# Patient Record
Sex: MALE | Race: WHITE | NOT HISPANIC OR LATINO
[De-identification: names, ages, dates, MRNs, and addresses within clinical notes are randomized per-mention and may not be internally consistent; named-entity substitution may affect disease eponyms.]

---
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
## 2021-05-11 ENCOUNTER — TRANSCRIPTION ENCOUNTER (OUTPATIENT)
Age: 21
End: 2021-05-11

## 2021-05-12 ENCOUNTER — APPOINTMENT (OUTPATIENT)
Dept: ORTHOPEDIC SURGERY | Facility: CLINIC | Age: 21
End: 2021-05-12
Payer: COMMERCIAL

## 2021-05-12 ENCOUNTER — NON-APPOINTMENT (OUTPATIENT)
Age: 21
End: 2021-05-12

## 2021-05-12 VITALS
WEIGHT: 244 LBS | SYSTOLIC BLOOD PRESSURE: 137 MMHG | HEART RATE: 92 BPM | HEIGHT: 68 IN | DIASTOLIC BLOOD PRESSURE: 83 MMHG | BODY MASS INDEX: 36.98 KG/M2

## 2021-05-12 PROCEDURE — 26600 TREAT METACARPAL FRACTURE: CPT | Mod: RT

## 2021-05-12 PROCEDURE — 99204 OFFICE O/P NEW MOD 45 MIN: CPT | Mod: 57

## 2021-05-12 PROCEDURE — 99072 ADDL SUPL MATRL&STAF TM PHE: CPT

## 2021-05-12 NOTE — HISTORY OF PRESENT ILLNESS
[FreeTextEntry1] : Mr. BAZAN presents for evaluation of a right hand fracture.  He reports he punched a wall on this past Sunday, and immediately felt pain along the ulnar aspect of the hand.  He reports pain and swelling has worsened since the injury.  He was seen in urgent care the following day where x-rays were taken revealing a boxer's fracture.  He was placed in an ulnar gutter splint and presents today for evaluation.  He notes since being splinted his pain has diminished.  He denies paresthesias.

## 2021-05-12 NOTE — PHYSICAL EXAM
[Normal RUE] : Right Upper Extremity: No scars, rashes, lesions, ulcers, skin intact [Normal Finger/nose] : finger to nose coordination [Normal] : no peripheral adenopathy appreciated [de-identified] : Right hand: Splint removed.  There is swelling and ecchymosis over the dorsum of the hand and fifth metacarpal.  There is a mild dorsal prominence over the fifth metacarpal neck. There is no malrotation. There is decreased range of motion secondary to pain. The skin is intact and there is no evidence of infection. [de-identified] : clarer [de-identified] : 3 x-ray views of the right hand taken on 5/10/2021 reviewed today reveals a 5th metacarpal head neck fracture,  minimally displaced

## 2021-05-12 NOTE — ASSESSMENT
[FreeTextEntry1] : 21-year-old male presents status post injury to the hand on 5/9/2021, right hand boxer's fracture. I recommend continued conservative treatment with use of a hand based ulnar gutter splint full time except bathing.  f/u in 2 weeks for repeat xrays and evaluation.\par

## 2021-05-26 ENCOUNTER — APPOINTMENT (OUTPATIENT)
Dept: ORTHOPEDIC SURGERY | Facility: CLINIC | Age: 21
End: 2021-05-26
Payer: COMMERCIAL

## 2021-05-26 VITALS
WEIGHT: 244 LBS | SYSTOLIC BLOOD PRESSURE: 130 MMHG | HEART RATE: 71 BPM | HEIGHT: 68 IN | BODY MASS INDEX: 36.98 KG/M2 | DIASTOLIC BLOOD PRESSURE: 77 MMHG

## 2021-05-26 PROCEDURE — 73130 X-RAY EXAM OF HAND: CPT | Mod: RT

## 2021-05-26 PROCEDURE — 99024 POSTOP FOLLOW-UP VISIT: CPT

## 2021-05-26 NOTE — PHYSICAL EXAM
[Normal RUE] : Right Upper Extremity: No scars, rashes, lesions, ulcers, skin intact [Normal Finger/nose] : finger to nose coordination [Normal] : no peripheral adenopathy appreciated [de-identified] : Right hand: Splint removed.  There is swelling and ecchymosis over the dorsum of the hand and fifth metacarpal.  There is a mild dorsal prominence over the fifth metacarpal neck. There is no malrotation. There is decreased range of motion secondary to pain. The skin is intact and there is no evidence of infection. [de-identified] : clarer [de-identified] : 3 x-ray views of the right hand taken today reviewed reveals a 5th metacarpal head neck fracture,  minimally displaced, no further displacement.

## 2021-05-26 NOTE — ASSESSMENT
[FreeTextEntry1] : 21-year-old male presents status post injury to the hand on 5/9/2021, right hand boxer's fracture. I recommend continued conservative treatment with use of a hand based ulnar gutter splint full time except bathing. He will remain nonweight bearing on the left hand, and has been recommended for light gentle ROM each day.  He will follow up in 3 weeks for reevaluation.

## 2021-05-26 NOTE — HISTORY OF PRESENT ILLNESS
[FreeTextEntry1] : Mr. BAZAN presents for evaluation of a right hand fracture.  He reports he punched a wall on this past Sunday, and immediately felt pain along the ulnar aspect of the hand.  He reports pain and swelling has worsened since the injury.  He was seen in urgent care the following day where x-rays were taken revealing a boxer's fracture.  He was placed in an ulnar gutter splint and presents today for evaluation.  He notes since being splinted his pain has diminished.  He denies paresthesias.\par \par 05/26/2021: Patient presents for reevaluation of his right hand fracture. He is now 2.5 weeks s/p closed treatment of a 5th MC head fracture. He reports his symptoms have improved over the last week. He presents today without his ulnar gutter splint, and reports he had lost it this past weekend.  He presents for repeat imaging today.

## 2021-06-14 ENCOUNTER — APPOINTMENT (OUTPATIENT)
Dept: ORTHOPEDIC SURGERY | Facility: CLINIC | Age: 21
End: 2021-06-14
Payer: COMMERCIAL

## 2021-06-14 DIAGNOSIS — S62.91XD UNSPECIFIED FRACTURE OF RIGHT WRIST AND HAND, SUBSEQUENT ENCOUNTER FOR FRACTURE WITH ROUTINE HEALING: ICD-10-CM

## 2021-06-14 PROCEDURE — 99024 POSTOP FOLLOW-UP VISIT: CPT

## 2021-06-14 PROCEDURE — 73110 X-RAY EXAM OF WRIST: CPT | Mod: RT

## 2021-06-14 NOTE — ASSESSMENT
[FreeTextEntry1] : 21-year-old male presents status post injury to the hand on 5/9/2021, right hand boxer's fracture. I recommend continued conservative treatment with use of a hand based ulnar gutter splint for strenuous activity, he may wean from the brace and begin to use his hand for light dexterity activities. Follow up in 4 weeks for repeat x-rays and evaluation.

## 2021-06-14 NOTE — PHYSICAL EXAM
[Normal RUE] : Right Upper Extremity: No scars, rashes, lesions, ulcers, skin intact [Normal Finger/nose] : finger to nose coordination [Normal] : no peripheral adenopathy appreciated [de-identified] : Right hand: Splint removed.  There is swelling and ecchymosis over the dorsum of the hand and fifth metacarpal.  There is a mild dorsal prominence over the fifth metacarpal neck. There is no malrotation. There is decreased range of motion secondary to pain. The skin is intact and there is no evidence of infection. [de-identified] : clarer [de-identified] : 3 x-ray views of the right hand taken today reviewed reveals a 5th metacarpal head neck fracture,  minimally displaced, no further displacement.

## 2021-06-15 PROBLEM — S62.91XD: Status: ACTIVE | Noted: 2021-05-26

## 2021-07-12 ENCOUNTER — APPOINTMENT (OUTPATIENT)
Dept: ORTHOPEDIC SURGERY | Facility: CLINIC | Age: 21
End: 2021-07-12

## 2021-07-12 NOTE — HISTORY OF PRESENT ILLNESS
[FreeTextEntry1] : Mr. BZAAN presents for evaluation of a right hand fracture.  He reports he punched a wall on this past Sunday, and immediately felt pain along the ulnar aspect of the hand.  He reports pain and swelling has worsened since the injury.  He was seen in urgent care the following day where x-rays were taken revealing a boxer's fracture.  He was placed in an ulnar gutter splint and presents today for evaluation.  He notes since being splinted his pain has diminished.  He denies paresthesias.\par \par 05/26/2021: Patient presents for reevaluation of his right hand fracture. He is now 2.5 weeks s/p closed treatment of a 5th MC head fracture. He reports his symptoms have improved over the last week. He presents today without his ulnar gutter splint, and reports he had lost it this past weekend.  He presents for repeat imaging today. \par \par 07/12/2021: Patient returns for reevaluation of a right hand fracture. He is now 6 weeks s/p closed treatment of a 5th MC head fracture.

## 2021-07-12 NOTE — PHYSICAL EXAM
[Normal RUE] : Right Upper Extremity: No scars, rashes, lesions, ulcers, skin intact [Normal Finger/nose] : finger to nose coordination [Normal] : no peripheral adenopathy appreciated [de-identified] : Right hand: There is swelling and ecchymosis over the dorsum of the hand and fifth metacarpal.  There is a mild dorsal prominence over the fifth metacarpal neck. There is no malrotation. There is decreased range of motion secondary to pain. The skin is intact and there is no evidence of infection. [de-identified] : clarer [de-identified] : 3 x-ray views of the right hand taken today reviewed reveals a 5th metacarpal head neck fracture,  minimally displaced, no further displacement.

## 2024-03-16 ENCOUNTER — EMERGENCY (EMERGENCY)
Facility: HOSPITAL | Age: 24
LOS: 1 days | Discharge: HOME | End: 2024-03-16
Attending: EMERGENCY MEDICINE
Payer: COMMERCIAL

## 2024-03-16 VITALS
TEMPERATURE: 98 F | HEIGHT: 69 IN | SYSTOLIC BLOOD PRESSURE: 151 MMHG | RESPIRATION RATE: 16 BRPM | HEART RATE: 87 BPM | OXYGEN SATURATION: 98 % | WEIGHT: 229.94 LBS | DIASTOLIC BLOOD PRESSURE: 89 MMHG

## 2024-03-16 VITALS — WEIGHT: 240.3 LBS

## 2024-03-16 PROCEDURE — 90675 RABIES VACCINE IM: CPT

## 2024-03-16 PROCEDURE — 99283 EMERGENCY DEPT VISIT LOW MDM: CPT | Mod: 25

## 2024-03-16 PROCEDURE — 90377 RABIES IG HT&SOL HUMAN IM/SC: CPT

## 2024-03-16 PROCEDURE — 99284 EMERGENCY DEPT VISIT MOD MDM: CPT

## 2024-03-16 PROCEDURE — 90471 IMMUNIZATION ADMIN: CPT

## 2024-03-16 PROCEDURE — 96372 THER/PROPH/DIAG INJ SC/IM: CPT

## 2024-03-16 RX ORDER — METRONIDAZOLE 500 MG
500 TABLET ORAL ONCE
Refills: 0 | Status: COMPLETED | OUTPATIENT
Start: 2024-03-16 | End: 2024-03-16

## 2024-03-16 RX ORDER — RABIES VACC, HUMAN DIPLOID/PF 2.5 UNIT
1 VIAL (EA) INTRAMUSCULAR ONCE
Refills: 0 | Status: COMPLETED | OUTPATIENT
Start: 2024-03-16 | End: 2024-03-16

## 2024-03-16 RX ORDER — METRONIDAZOLE 500 MG
1 TABLET ORAL
Qty: 21 | Refills: 0
Start: 2024-03-16 | End: 2024-03-22

## 2024-03-16 RX ORDER — HUMAN RABIES VIRUS IMMUNE GLOBULIN 150 [IU]/ML
1600 INJECTION, SOLUTION INTRAMUSCULAR ONCE
Refills: 0 | Status: COMPLETED | OUTPATIENT
Start: 2024-03-16 | End: 2024-03-16

## 2024-03-16 RX ORDER — IBUPROFEN 200 MG
600 TABLET ORAL ONCE
Refills: 0 | Status: COMPLETED | OUTPATIENT
Start: 2024-03-16 | End: 2024-03-16

## 2024-03-16 RX ADMIN — Medication 600 MILLIGRAM(S): at 15:37

## 2024-03-16 RX ADMIN — HUMAN RABIES VIRUS IMMUNE GLOBULIN 1600 UNIT(S): 150 INJECTION, SOLUTION INTRAMUSCULAR at 16:06

## 2024-03-16 RX ADMIN — Medication 1 TABLET(S): at 15:37

## 2024-03-16 RX ADMIN — Medication 1 MILLILITER(S): at 15:54

## 2024-03-16 RX ADMIN — Medication 500 MILLIGRAM(S): at 15:38

## 2024-03-16 NOTE — ED PROVIDER NOTE - PHYSICAL EXAMINATION
Attending note.  Patient is alert and in no acute distress.  Examination of the right hand reveals 2 punctures on the tip of the right fourth finger.  There is slight swelling.  Sensation is intact and normal.
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
## 2024-03-16 NOTE — ED PROVIDER NOTE - PROGRESS NOTE DETAILS
Animal bite reported to STEVIE Jordan in [facility] by me (Ashley Banda). Asymptomatic after rabies vaccination.

## 2024-03-16 NOTE — ED PROVIDER NOTE - PATIENT PORTAL LINK FT
You can access the FollowMyHealth Patient Portal offered by [facility] by registering at the following website: [email or web address]. By joining [facility]’s FollowMyHealth portal, you will also be able to view your health information using other applications (apps) compatible with our system.

## 2024-03-16 NOTE — ED PROVIDER NOTE - CARE PLAN
Principal Discharge DX:	Open wound of finger of right hand due to animal bite  Secondary Diagnosis:	Need for post exposure prophylaxis for rabies   1
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
## 2024-03-16 NOTE — ED PROVIDER NOTE - NSFOLLOWUPINSTRUCTIONS_ED_ALL_ED_FT
Please see the information of Animal bite.    Keep the wound clean and dry washing with soap and water gently.    Bacitracin ointment to wound twice a day.    Take Bactrim DS and Flagyl as prescribed.    Take Ibuprofen (600mg every 8hours with food) or Tylenol (2 tablets of 500mg every 8hours) as needed for pain.    Return to ED for Rabies Vaccination on 3/19 (day 3), 3/23 (day 7), 3/30 (day 14).    Return for any concerns, fever, numbness, weakness, headache, redness/discharge around wound, or new symptoms. Please see the information of Animal bite and Rabies vaccine.    Keep the wound clean and dry washing with soap and water gently.    Bacitracin ointment to wound twice a day.    Take Bactrim DS and Flagyl as prescribed.    Take Ibuprofen (600mg every 8hours with food) or Tylenol (2 tablets of 500mg every 8hours) as needed for pain.    Return to ED for Rabies Vaccination on 3/19 (day 3), 3/23 (day 7), 3/30 (day 14).    Return for any concerns, fever, numbness, weakness, headache, redness/discharge around wound, or new symptoms.

## 2025-07-24 NOTE — ED PROVIDER NOTE - TOBACCO USE
See TE from 7/23/25. PA was submitted for ozempic.     Please advise on face cream rx   Never smoker